# Patient Record
Sex: FEMALE | Race: WHITE
[De-identification: names, ages, dates, MRNs, and addresses within clinical notes are randomized per-mention and may not be internally consistent; named-entity substitution may affect disease eponyms.]

---

## 2021-03-09 ENCOUNTER — HOSPITAL ENCOUNTER (OUTPATIENT)
Dept: HOSPITAL 76 - LAB.N | Age: 26
Discharge: HOME | End: 2021-03-09
Attending: ADVANCED PRACTICE MIDWIFE
Payer: COMMERCIAL

## 2021-03-09 DIAGNOSIS — O26.859: Primary | ICD-10-CM

## 2021-03-12 ENCOUNTER — HOSPITAL ENCOUNTER (OUTPATIENT)
Dept: HOSPITAL 76 - DI | Age: 26
Discharge: HOME | End: 2021-03-12
Attending: OBSTETRICS & GYNECOLOGY
Payer: COMMERCIAL

## 2021-03-12 DIAGNOSIS — O26.859: Primary | ICD-10-CM

## 2021-03-13 NOTE — ULTRASOUND REPORT
PROCEDURE:  OB First Trimester w/TV

 

INDICATIONS:  SPOTTING, PREGNANT

 

OUTSIDE/PRIOR DATING DATA:  

Last menstrual period (LMP): 12/2/2020.  

LMP-based estimated date of delivery (HIWOT): 9/8/2021.  

First dating scan (date and location): 3/12/2021.  

Estimated date of delivery (HIWOT) from first dating scan: N/K: No evidence of intrauterine pregnancy o
r ectopic pregnancy.

 

TECHNIQUE:  

Real-time scanning was performed of the maternal pelvic organs, with image documentation.  Endovagina
l scanning was also performed to better visualize the fetus and maternal ovaries.  

 

COMPARISON:  None

 

FINDINGS:  There is no evidence of intrauterine pregnancy or ectopic pregnancy. The uterus measures 7
 x 3 x 4 cm. Endometrium is 7.4 mm in thickness.

 

Left ovary measures 3.3 x 4.2 x 2.0 cm. Right ovary measures 3.2 x 1.9 x 2.9 cm. There is normal intr
aovarian flow noted bilaterally.

 

No abnormal adnexal masses.

 

 

IMPRESSION:  

1. No evidence of intrauterine pregnancy or ectopic pregnancy.

2. Otherwise unremarkable pelvic ultrasound.

 

A preliminary report with the above findings was provided at the time of the study by [facility].

 

Reviewed by: Chaitanya Valdez MD on 3/13/2021 6:33 AM [place]

Approved by: Chaitanya Valdez MD on 3/13/2021 6:33 AM [facility]

 

 

Station ID:  [id number]

## 2021-03-15 ENCOUNTER — HOSPITAL ENCOUNTER (OUTPATIENT)
Dept: HOSPITAL 76 - LAB | Age: 26
Discharge: HOME | End: 2021-03-15
Attending: OBSTETRICS & GYNECOLOGY
Payer: COMMERCIAL

## 2021-03-15 DIAGNOSIS — Z20.822: ICD-10-CM

## 2021-03-15 DIAGNOSIS — Z01.812: Primary | ICD-10-CM

## 2021-03-15 DIAGNOSIS — O02.1: ICD-10-CM

## 2021-03-15 LAB
BASOPHILS NFR BLD AUTO: 0.1 10^3/UL (ref 0–0.1)
BASOPHILS NFR BLD AUTO: 0.7 %
EOSINOPHIL # BLD AUTO: 0.2 10^3/UL (ref 0–0.7)
EOSINOPHIL NFR BLD AUTO: 2.5 %
ERYTHROCYTE [DISTWIDTH] IN BLOOD BY AUTOMATED COUNT: 16 % (ref 12–15)
HCT VFR BLD AUTO: 36.1 % (ref 37–47)
HGB UR QL STRIP: 10.2 G/DL (ref 12–16)
LYMPHOCYTES # SPEC AUTO: 1.6 10^3/UL (ref 1.5–3.5)
LYMPHOCYTES NFR BLD AUTO: 22 %
MCH RBC QN AUTO: 22.4 PG (ref 27–31)
MCHC RBC AUTO-ENTMCNC: 28.3 G/DL (ref 32–36)
MCV RBC AUTO: 79.2 FL (ref 81–99)
MONOCYTES # BLD AUTO: 0.6 10^3/UL (ref 0–1)
MONOCYTES NFR BLD AUTO: 7.9 %
NEUTROPHILS # BLD AUTO: 4.8 10^3/UL (ref 1.5–6.6)
NEUTROPHILS # SNV AUTO: 7.2 X10^3/UL (ref 4.8–10.8)
NEUTROPHILS NFR BLD AUTO: 66.9 %
NRBC # BLD AUTO: 0 /100WBC
NRBC # BLD AUTO: 0 X10^3/UL
PDW BLD AUTO: 11.1 FL (ref 7.9–10.8)
PLAT MORPH BLD: (no result)
PLATELET # BLD: 251 10^3/UL (ref 130–450)
PLATELET BLD QL SMEAR: (no result)
RBC MAR: 4.56 10^6/UL (ref 4.2–5.4)
RBC MORPH BLD: (no result)

## 2021-03-15 PROCEDURE — 85025 COMPLETE CBC W/AUTO DIFF WBC: CPT

## 2021-03-15 PROCEDURE — 36415 COLL VENOUS BLD VENIPUNCTURE: CPT

## 2021-03-18 ENCOUNTER — HOSPITAL ENCOUNTER (OUTPATIENT)
Dept: HOSPITAL 76 - SDS | Age: 26
Discharge: HOME | End: 2021-03-18
Attending: OBSTETRICS & GYNECOLOGY
Payer: COMMERCIAL

## 2021-03-18 VITALS — DIASTOLIC BLOOD PRESSURE: 62 MMHG | SYSTOLIC BLOOD PRESSURE: 101 MMHG

## 2021-03-18 DIAGNOSIS — F17.200: ICD-10-CM

## 2021-03-18 DIAGNOSIS — E66.3: ICD-10-CM

## 2021-03-18 DIAGNOSIS — D25.0: Primary | ICD-10-CM

## 2021-03-18 LAB — HCG UR QL: NEGATIVE

## 2021-03-18 PROCEDURE — 58558 HYSTEROSCOPY BIOPSY: CPT

## 2021-03-18 PROCEDURE — 0UDB8ZX EXTRACTION OF ENDOMETRIUM, VIA NATURAL OR ARTIFICIAL OPENING ENDOSCOPIC, DIAGNOSTIC: ICD-10-PCS | Performed by: OBSTETRICS & GYNECOLOGY

## 2021-03-18 PROCEDURE — 84702 CHORIONIC GONADOTROPIN TEST: CPT

## 2021-03-18 PROCEDURE — 81025 URINE PREGNANCY TEST: CPT

## 2021-03-18 NOTE — ANESTHESIA
Pre-Anesthesia VS, & Labs





- Diagnosis


Retained products of conception





- Procedure





myosure hysterscopy, D&C


Vital Signs: 





                                        











Temp Pulse Resp BP Pulse Ox


 


 36.7 C   97   16   135/74 H  100 


 


 03/18/21 06:45  03/18/21 06:45  03/18/21 06:45  03/18/21 06:45  03/18/21 06:45











Height: 5 ft 1 in


Weight (kg): 63 kg


Body Mass Index: 26.2


BMI Classification: Overweight





- NPO


>8 hours





- Pregnancy


Is Patient Pregnant?: No





- Lab Results


Current Lab Results: 





Laboratory Tests





03/18/21 07:16: POC Whole Bld Glucose 100











Home Medications and Allergies


Home Medications: 


Ambulatory Orders





Propranolol [Inderal] 10 mg PO BID 03/17/21 











                                        





Propranolol [Inderal] 10 mg PO BID 03/17/21 








Allergies/Adverse Reactions: 


                                    Allergies











Allergy/AdvReac Type Severity Reaction Status Date / Time


 


cefaclor Allergy  Unknown Verified 03/16/21 11:27














Anes History & Medical History





- Anesthetic History


Family history of Anesthesia Complications: Denies


Family history of Malignant Hyperthermia: Denies





- Medical History


Cardiovascular: reports: None, Other (palpitations when anxious)


Pulmonary: reports: None


Gastrointestinal: reports: None


Urinary: reports: None


Neuro: reports: None


Musculoskeletal: reports: None


Endocrine/Autoimmune: reports: None


Blood Disorders: reports: None


Skin: reports: None


Smoking Status: Current every day smoker


Psychosocial: reports: Anxiety


History of Cancer?: No





Exam


General: Alert, Oriented x3, Cooperative, No acute distress


Dental: WNL


Mouth Opening: 3 Fingerbreadth


Neck Mobility: Normal


Mallampati classification: I


Thyromental Distance: 4-6 cm


Mental/Cognitive Status: Alert/Oriented X3, Normal for patient





Plan


Anesthesia Type: General


Consent for Procedure(s) Verified and Reviewed: Yes


Code Status: Attempt Resuscitation


ASA classification: 1-Healthy patient


Is this case an emergency?: No

## 2021-03-18 NOTE — OPERATIVE REPORT
Operative Report





- General


Procedure Date: 03/18/21


Planned Procedure: 


Hysterscopy with Myosure





Pre-Op Diagnosis: persistant Quantiaive HCG retained POC


Procedure Performed: 


Same








Post Op Diagnosis: Persistant QuantativeHCG





- Procedure Note


Primary Surgeon: Juan Bunn MD


Anesthesia Provider: Lashawn Jenkins CRNA


Anesthesia Technique: Local, MAC


Pathology: 


Endometrium





Estimated Blood Loss (mL): 10

## 2021-03-18 NOTE — OPERATIVE REPORT
DATE OF SERVICE: 03/18/2021

Physician: Juan Bunn MD

 

PREOPERATIVE DIAGNOSES:  Persistent quantitative hCG, rule out retained products of conception.

 

POSTOPERATIVE DIAGNOSES:  Continued persistent quantitative hCG with empty uterus.

 

PROCEDURE PERFORMED:  Hysteroscopy with MyoSure.

 

SURGEON:  Juan Bunn MD.

  

ANESTHESIOLOGIST:  Primo Jenkins CRNA.

 

ANESTHETIC:  IV sedation with paracervical block.

 

FINDINGS:  Upon entering the uterine cavity, there was no evidence of any tissue.  Both cornu were in
spected.  There was no evidence of any bleeding.  

 

DESCRIPTION OF PROCEDURE:  Following adequate IV sedation, the patient was placed in the dorsal litho
kimi position in [facility].  At this point, she was prepped and draped in the usual fashion.  A 
timeout was performed.  The cervix was visualized with a speculum, and then the uterosacral ligaments
 were injected with 5 mL each of 0.25% Marcaine with epinephrine and 1% lidocaine.  At this time, the
 cervix was grasped with a single-tooth tenaculum.  It was then dilated up to 7 mm.  A hysteroscope w
as introduced.  The uterus was visualized, both cornu as well as anterior and posteriorly, and there 
was no evidence of any visible placental tissue.  Tissue samples were taken with the MyoSure.  These 
were sent for evaluation.  At this point, the procedure was finished.  The cervix was released from t
he single-tooth tenaculum.  The patient tolerated the procedure well and was taken to recovery in sta
ble condition.  Fluid deficit was 270 mL.  

 

 

DD: 03/18/2021 08:40

TD: 03/18/2021 08:43

Job #: [id number]

## 2021-03-18 NOTE — ANESTHESIA POST OP EVALUATION
Anesthesia Post Eval





- Post Anesthesia Eval


Vitals: 





                                Last Vital Signs











Temp  36.0 C L  03/18/21 08:26


 


Pulse  73   03/18/21 08:45


 


Resp  22   03/18/21 08:45


 


BP  104/74   03/18/21 08:45


 


Pulse Ox  100   03/18/21 08:45











CV Function Including HR & BP: positive: Stable


Pain Control: positive: Satisfactory


Nausea & Vomiting: positive: Negative


Mental Status: positive: Baseline


Respiratory Status: Airway Patent


Hydration Status: Satisfactory


Anesthesia Complications: positive: None

## 2021-03-24 ENCOUNTER — HOSPITAL ENCOUNTER (OUTPATIENT)
Dept: HOSPITAL 76 - LAB | Age: 26
Discharge: HOME | End: 2021-03-24
Attending: OBSTETRICS & GYNECOLOGY
Payer: COMMERCIAL

## 2021-03-24 DIAGNOSIS — O26.859: Primary | ICD-10-CM

## 2021-03-24 PROCEDURE — 36415 COLL VENOUS BLD VENIPUNCTURE: CPT

## 2021-03-24 PROCEDURE — 84702 CHORIONIC GONADOTROPIN TEST: CPT

## 2022-12-24 ENCOUNTER — HOSPITAL ENCOUNTER (EMERGENCY)
Dept: HOSPITAL 76 - ED | Age: 27
Discharge: HOME | End: 2022-12-24
Payer: COMMERCIAL

## 2022-12-24 VITALS — SYSTOLIC BLOOD PRESSURE: 138 MMHG | DIASTOLIC BLOOD PRESSURE: 94 MMHG

## 2022-12-24 DIAGNOSIS — K62.5: Primary | ICD-10-CM

## 2022-12-24 DIAGNOSIS — F17.200: ICD-10-CM

## 2022-12-24 LAB
ALBUMIN DIAFP-MCNC: 4.2 G/DL (ref 3.2–5.5)
ALBUMIN/GLOB SERPL: 1.1 {RATIO} (ref 1–2.2)
ALP SERPL-CCNC: 97 IU/L (ref 42–121)
ALT SERPL W P-5'-P-CCNC: 39 IU/L (ref 10–60)
ANION GAP SERPL CALCULATED.4IONS-SCNC: 9 MMOL/L (ref 6–13)
AST SERPL W P-5'-P-CCNC: 31 IU/L (ref 10–42)
BASOPHILS NFR BLD AUTO: 0.1 10^3/UL (ref 0–0.1)
BASOPHILS NFR BLD AUTO: 0.8 %
BILIRUB BLD-MCNC: 0.7 MG/DL (ref 0.2–1)
BUN SERPL-MCNC: 16 MG/DL (ref 6–20)
CALCIUM UR-MCNC: 9.1 MG/DL (ref 8.5–10.3)
CHLORIDE SERPL-SCNC: 104 MMOL/L (ref 101–111)
CLARITY UR REFRACT.AUTO: CLEAR
CO2 SERPL-SCNC: 28 MMOL/L (ref 21–32)
CREAT SERPLBLD-SCNC: 0.7 MG/DL (ref 0.4–1)
EOSINOPHIL # BLD AUTO: 0.1 10^3/UL (ref 0–0.7)
EOSINOPHIL NFR BLD AUTO: 1.8 %
ERYTHROCYTE [DISTWIDTH] IN BLOOD BY AUTOMATED COUNT: 15 % (ref 12–15)
GFRSERPLBLD MDRD-ARVRAT: 100 ML/MIN/{1.73_M2} (ref 89–?)
GLOBULIN SER-MCNC: 3.8 G/DL (ref 2.1–4.2)
GLUCOSE SERPL-MCNC: 96 MG/DL (ref 70–100)
GLUCOSE UR QL STRIP.AUTO: NEGATIVE MG/DL
HCG UR QL: NEGATIVE
HCT VFR BLD AUTO: 43.5 % (ref 37–47)
HGB UR QL STRIP: 13.6 G/DL (ref 12–16)
KETONES UR QL STRIP.AUTO: NEGATIVE MG/DL
LIPASE SERPL-CCNC: 27 U/L (ref 22–51)
LYMPHOCYTES # SPEC AUTO: 2.5 10^3/UL (ref 1.5–3.5)
LYMPHOCYTES NFR BLD AUTO: 34.2 %
MCH RBC QN AUTO: 28.9 PG (ref 27–31)
MCHC RBC AUTO-ENTMCNC: 31.3 G/DL (ref 32–36)
MCV RBC AUTO: 92.4 FL (ref 81–99)
MONOCYTES # BLD AUTO: 0.4 10^3/UL (ref 0–1)
MONOCYTES NFR BLD AUTO: 5.7 %
NEUTROPHILS # BLD AUTO: 4.1 10^3/UL (ref 1.5–6.6)
NEUTROPHILS # SNV AUTO: 7.2 X10^3/UL (ref 4.8–10.8)
NEUTROPHILS NFR BLD AUTO: 57.1 %
NITRITE UR QL STRIP.AUTO: NEGATIVE
NRBC # BLD AUTO: 0 /100WBC
NRBC # BLD AUTO: 0 X10^3/UL
PDW BLD AUTO: 11.6 FL (ref 7.9–10.8)
PH UR STRIP.AUTO: 6 PH (ref 5–7.5)
PLATELET # BLD: 256 10^3/UL (ref 130–450)
POTASSIUM SERPL-SCNC: 4 MMOL/L (ref 3.5–5)
PROT SPEC-MCNC: 8 G/DL (ref 6.7–8.2)
PROT UR STRIP.AUTO-MCNC: NEGATIVE MG/DL
RBC # UR STRIP.AUTO: NEGATIVE /UL
RBC MAR: 4.71 10^6/UL (ref 4.2–5.4)
SODIUM SERPLBLD-SCNC: 141 MMOL/L (ref 135–145)
SP GR UR STRIP.AUTO: 1.02 (ref 1–1.03)
UROBILINOGEN UR QL STRIP.AUTO: (no result) E.U./DL
UROBILINOGEN UR STRIP.AUTO-MCNC: NEGATIVE MG/DL

## 2022-12-24 PROCEDURE — 36415 COLL VENOUS BLD VENIPUNCTURE: CPT

## 2022-12-24 PROCEDURE — 83690 ASSAY OF LIPASE: CPT

## 2022-12-24 PROCEDURE — 85025 COMPLETE CBC W/AUTO DIFF WBC: CPT

## 2022-12-24 PROCEDURE — 81001 URINALYSIS AUTO W/SCOPE: CPT

## 2022-12-24 PROCEDURE — 81025 URINE PREGNANCY TEST: CPT

## 2022-12-24 PROCEDURE — 81003 URINALYSIS AUTO W/O SCOPE: CPT

## 2022-12-24 PROCEDURE — 80053 COMPREHEN METABOLIC PANEL: CPT

## 2022-12-24 PROCEDURE — 99283 EMERGENCY DEPT VISIT LOW MDM: CPT

## 2022-12-24 PROCEDURE — 99282 EMERGENCY DEPT VISIT SF MDM: CPT

## 2022-12-24 PROCEDURE — 87086 URINE CULTURE/COLONY COUNT: CPT

## 2022-12-24 NOTE — ED PHYSICIAN DOCUMENTATION
History of Present Illness





- Stated complaint


Stated Complaint: BLOOD IN STOOL





- Chief complaint


Chief Complaint: Abd Pain





- Additonal information


Additional information: 





27-year-old female presents emergency department for evaluation of painless 

rectal bleeding.  She was at work this morning when she had a bowel movement.  

When she wiped she noticed a lot of blood on the tissue and looked in the toilet

and states it was filled with blood.  No history of similar.  She denies a 

history of constipation.  Typically has daily bowel movements.





She is not anticoagulated and does not take daily NSAID medications.  Denies 

hematochezia or melena. Frequent alcohol





Past surgical history most significant only for a D&C.





Review of Systems


Constitutional: reports: Reviewed and negative


Throat: reports: Reviewed and negative


Cardiac: reports: Reviewed and negative


Respiratory: reports: Reviewed and negative


GI: reports: Other (Rectal bleeding)


: reports: Reviewed and negative


Skin: reports: Reviewed and negative





PD PAST MEDICAL HISTORY





- Past Medical History


Cardiovascular: None, Other (palpitations when anxious)


Respiratory: None


Neuro: None


Endocrine/Autoimmune: None


GI: None


: None


HEENT: None


Psych: Anxiety


Musculoskeletal: None


Derm: None





- Past Surgical History


Past Surgical History: No





- Present Medications


Home Medications: 


                                Ambulatory Orders











 Medication  Instructions  Recorded  Confirmed


 


Ibuprofen [Motrin] 400 mg PO Q6H PRN #30 tablet 07/18/16 


 


Propranolol [Inderal] 10 mg PO BID 03/17/21 03/17/21














- Allergies


Allergies/Adverse Reactions: 


                                    Allergies











Allergy/AdvReac Type Severity Reaction Status Date / Time


 


cefaclor Allergy  Unknown Verified 03/16/21 11:27














- Social History


Does the pt smoke?: Yes


Smoking Status: Current every day smoker


Does the pt drink ETOH?: Yes


Does the pt have substance abuse?: No





PD ED PE NORMAL





- General


General: Alert and oriented X 3, No acute distress





- HEENT


HEENT: PERRL





- Cardiac


Cardiac: RRR, No murmur





- Respiratory


Respiratory: No respiratory distress, Clear bilaterally





- Abdomen


Abdomen: Normal bowel sounds, Soft





- Rectal


Rectal: Other (Chaperoned rectal exam with MANFRED Mahoney.  Externally no fissures

 or hemorrhoids seen.  No tenderness of the rectum.  Digital rectal exam 

revealed no tenderness and no blood in the vault.  No stool.)





- Derm


Derm: Normal color, Warm and dry





- Extremities


Extremities: No deformity, No tenderness to palpate, Normal ROM s pain





- Neuro


Neuro: Alert and oriented X 3, CNs 2-12 intact


Eye Opening: Spontaneous


Motor: Obeys Commands


Verbal: Oriented


GCS Score: 15





Results





- Vitals


Vitals: 





                               Vital Signs - 24 hr











  12/24/22





  09:14


 


Temperature 36.4 C L


 


Heart Rate 97


 


Respiratory 16





Rate 


 


Blood Pressure 120/84 H


 


O2 Saturation 100








                                     Oxygen











O2 Source                      Room air

















- Labs


Labs: 





                                Laboratory Tests











  12/24/22 12/24/22





  09:45 09:45


 


WBC  7.2 


 


RBC  4.71 


 


Hgb  13.6 


 


Hct  43.5 


 


MCV  92.4 


 


MCH  28.9 


 


MCHC  31.3 L 


 


RDW  15.0 


 


Plt Count  256 


 


MPV  11.6 H 


 


Neut # (Auto)  4.1 


 


Lymph # (Auto)  2.5 


 


Mono # (Auto)  0.4 


 


Eos # (Auto)  0.1 


 


Baso # (Auto)  0.1 


 


Absolute Nucleated RBC  0.00 


 


Nucleated RBC %  0.0 


 


Sodium   141


 


Potassium   4.0


 


Chloride   104


 


Carbon Dioxide   28


 


Anion Gap   9.0


 


BUN   16


 


Creatinine   0.7


 


Estimated GFR (MDRD)   100


 


Glucose   96


 


Calcium   9.1


 


Total Bilirubin   0.7


 


AST   31


 


ALT   39


 


Alkaline Phosphatase   97


 


Total Protein   8.0


 


Albumin   4.2


 


Globulin   3.8


 


Albumin/Globulin Ratio   1.1


 


Lipase   27














PD Medical Decision Making





- ED course


Complexity details: reviewed results, considered differential, d/w patient


ED course: 





27-year-old female presents the emergency department for evaluation of painless 

rectal bleeding that she noted this morning when she defecated while at work.  

No history of similar in the past.





Here in the emergency department we did obtain a CBC and electrolytes with no 

worrisome findings such as anemia or an elevated BUN.  Her abdominal exam was 

benign.  Chaperoned rectal exam revealed no obvious findings such as hemorrhoid 

or fissure.  There was no stool available for testing.





I do suspect that she likely has an internal hemorrhoid.  I making the 

recommendation for her to use Preparation H suppository after her bowel 

movements each day for the next 3 to 4 days.  I am also recommending her to 

soften her stools with Metamucil.  If symptoms not markedly improved would 

benefit from follow-up with her primary care provider for evaluation of a col

onoscopy. Given age and lack of pertinent hx I doubt condition such as 

inflammatory bowel disease.  No abdominal tenderness was elicited, therefore I 

doubt Meckel's diverticulitis.  In addition given her unremarkable labs we 

elected to defer any CT imaging today.





Patient is discharged home in stable condition.  Emergent return precautions 

discussed





Departure





- Departure


Disposition: 01 Home, Self Care


Clinical Impression: 


 Rectal bleed





Condition: Stable


Record reviewed to determine appropriate education?: Yes


Comments: 


You are seen today in the emergency department for painless rectal bleeding.  

Here in the emergency department we did obtain a CBC and electrolytes and they 

are essentially normal.  Your vital signs are also normal.  We did perform a 

chaperoned rectal exam and did not find anything to suggest anal fissures, 

external hemorrhoids or internal hemorrhoids though I suspect you may have 1 

higher up than could be felt on the exam.





I do recommend that you increase your water intake and begin taking a bulking 

stool softener such as Metamucil daily.  This will help prevent any straining 

that you may have with defecation.  I would also like you to buy Preparation H 

suppositories over-the-counter and insert 1 each day for the next 3 to 4 days 

after your bowel movements.





If you find that this rectal bleeding continues you will need to see your 

primary care doctor because at that point you should be referred for colonoscopy

 for evaluation of other concerns or causes of rectal bleeding such as polyps, 

inflammatory bowel disease or hemorrhoids





Return to the emergency department for any fainting episodes or severe bleeding.

## 2023-03-09 ENCOUNTER — HOSPITAL ENCOUNTER (EMERGENCY)
Dept: HOSPITAL 76 - ED | Age: 28
Discharge: HOME | End: 2023-03-09
Payer: COMMERCIAL

## 2023-03-09 ENCOUNTER — HOSPITAL ENCOUNTER (OUTPATIENT)
Dept: HOSPITAL 76 - EMS | Age: 28
Discharge: ANOTHER HOSPITAL | End: 2023-03-09
Payer: COMMERCIAL

## 2023-03-09 VITALS — SYSTOLIC BLOOD PRESSURE: 115 MMHG | DIASTOLIC BLOOD PRESSURE: 84 MMHG

## 2023-03-09 DIAGNOSIS — Z79.899: ICD-10-CM

## 2023-03-09 DIAGNOSIS — R06.4: ICD-10-CM

## 2023-03-09 DIAGNOSIS — E87.6: ICD-10-CM

## 2023-03-09 DIAGNOSIS — F41.9: Primary | ICD-10-CM

## 2023-03-09 DIAGNOSIS — R20.2: ICD-10-CM

## 2023-03-09 DIAGNOSIS — R20.2: Primary | ICD-10-CM

## 2023-03-09 DIAGNOSIS — F17.200: ICD-10-CM

## 2023-03-09 DIAGNOSIS — R00.0: ICD-10-CM

## 2023-03-09 LAB
ALBUMIN DIAFP-MCNC: 4.5 G/DL (ref 3.2–5.5)
ALBUMIN/GLOB SERPL: 1.2 {RATIO} (ref 1–2.2)
ALP SERPL-CCNC: 86 IU/L (ref 42–121)
ALT SERPL W P-5'-P-CCNC: 35 IU/L (ref 10–60)
ANION GAP SERPL CALCULATED.4IONS-SCNC: 16 MMOL/L (ref 6–13)
AST SERPL W P-5'-P-CCNC: 32 IU/L (ref 10–42)
B-HCG SERPL QL: NEGATIVE
BASOPHILS NFR BLD AUTO: 0.1 10^3/UL (ref 0–0.1)
BASOPHILS NFR BLD AUTO: 0.6 %
BILIRUB BLD-MCNC: 0.5 MG/DL (ref 0.2–1)
BUN SERPL-MCNC: 10 MG/DL (ref 6–20)
CALCIUM UR-MCNC: 9.5 MG/DL (ref 8.5–10.3)
CHLORIDE SERPL-SCNC: 100 MMOL/L (ref 101–111)
CO2 SERPL-SCNC: 25 MMOL/L (ref 21–32)
CREAT SERPLBLD-SCNC: 0.7 MG/DL (ref 0.4–1)
EOSINOPHIL # BLD AUTO: 0.1 10^3/UL (ref 0–0.7)
EOSINOPHIL NFR BLD AUTO: 0.5 %
ERYTHROCYTE [DISTWIDTH] IN BLOOD BY AUTOMATED COUNT: 16.3 % (ref 12–15)
GFRSERPLBLD MDRD-ARVRAT: 100 ML/MIN/{1.73_M2} (ref 89–?)
GLOBULIN SER-MCNC: 3.7 G/DL (ref 2.1–4.2)
GLUCOSE SERPL-MCNC: 88 MG/DL (ref 70–100)
HCT VFR BLD AUTO: 46 % (ref 37–47)
HGB UR QL STRIP: 14.5 G/DL (ref 12–16)
LIPASE SERPL-CCNC: 27 U/L (ref 22–51)
LYMPHOCYTES # SPEC AUTO: 1.7 10^3/UL (ref 1.5–3.5)
LYMPHOCYTES NFR BLD AUTO: 14 %
MCH RBC QN AUTO: 29.2 PG (ref 27–31)
MCHC RBC AUTO-ENTMCNC: 31.5 G/DL (ref 32–36)
MCV RBC AUTO: 92.7 FL (ref 81–99)
MONOCYTES # BLD AUTO: 0.5 10^3/UL (ref 0–1)
MONOCYTES NFR BLD AUTO: 3.9 %
NEUTROPHILS # BLD AUTO: 9.8 10^3/UL (ref 1.5–6.6)
NEUTROPHILS # SNV AUTO: 12.2 X10^3/UL (ref 4.8–10.8)
NEUTROPHILS NFR BLD AUTO: 80.8 %
NRBC # BLD AUTO: 0 /100WBC
NRBC # BLD AUTO: 0 X10^3/UL
PDW BLD AUTO: 11.7 FL (ref 7.9–10.8)
PLATELET # BLD: 245 10^3/UL (ref 130–450)
POTASSIUM SERPL-SCNC: 3 MMOL/L (ref 3.5–5)
PROT SPEC-MCNC: 8.2 G/DL (ref 6.7–8.2)
RBC MAR: 4.96 10^6/UL (ref 4.2–5.4)
SODIUM SERPLBLD-SCNC: 141 MMOL/L (ref 135–145)

## 2023-03-09 PROCEDURE — 99284 EMERGENCY DEPT VISIT MOD MDM: CPT

## 2023-03-09 PROCEDURE — 85025 COMPLETE CBC W/AUTO DIFF WBC: CPT

## 2023-03-09 PROCEDURE — 99283 EMERGENCY DEPT VISIT LOW MDM: CPT

## 2023-03-09 PROCEDURE — 36415 COLL VENOUS BLD VENIPUNCTURE: CPT

## 2023-03-09 PROCEDURE — 80053 COMPREHEN METABOLIC PANEL: CPT

## 2023-03-09 PROCEDURE — 83690 ASSAY OF LIPASE: CPT

## 2023-03-09 PROCEDURE — 84703 CHORIONIC GONADOTROPIN ASSAY: CPT

## 2023-03-09 NOTE — ED PHYSICIAN DOCUMENTATION
History of Present Illness





- Stated complaint


Stated Complaint: PANIC ATTACK





- Chief complaint


Chief Complaint: MHE





- Additonal information


Additional information: 





27-year-old female presents to the emergency department for concerns of a panic 

attack.  She works as a  and suddenly began to feel flushed have a 

racing heart, felt paresthesias in her hands and then on the top of her head and

both feet.  reported bith her hands began to spasm.  She felt like she could not

talk.  worried she was having a stroke.  She is transported here via EMS.





Patient currently undergoing fertility treatments uncertain if pregnant.  She 

does have a history of anxiety for which she takes Lexapro.  Occasionally uses 

Xanax and used it about 2 weeks ago.  She states she has had panic attacks in 

the past.  She cannot identify precipitating cause today.





On presentation and evaluation I am greeted by a anxious appearing woman.  She 

has trembling hands.  Fluid speech.  No focal deficits.  She is tearful but 

trying not to cry.





She denies any recent illness though did vomit twice this morning.  No diarrhea,

chest pain, shortness of air unilateral leg swelling, cough or fevers.  No 

abdominal pain.  No dysuria urgency or frequency.





Review of Systems


Constitutional: reports: Reviewed and negative


Eyes: reports: Reviewed and negative


Throat: reports: Reviewed and negative


Cardiac: reports: Reviewed and negative


Respiratory: reports: Reviewed and negative


GI: reports: Vomiting


: reports: Reviewed and negative


Skin: reports: Reviewed and negative


Musculoskeletal: reports: Reviewed and negative





PD PAST MEDICAL HISTORY





- Past Medical History


Cardiovascular: None, Other (palpitations when anxious)


Respiratory: None


Neuro: None


Endocrine/Autoimmune: None


GI: None


: None


HEENT: None


Psych: Anxiety


Musculoskeletal: None


Derm: None





- Past Surgical History


Past Surgical History: No





- Present Medications


Home Medications: 


                                Ambulatory Orders











 Medication  Instructions  Recorded  Confirmed


 


Escitalopram [Lexapro] 10 mg PO DAILY 03/09/23 03/09/23














- Allergies


Allergies/Adverse Reactions: 


                                    Allergies











Allergy/AdvReac Type Severity Reaction Status Date / Time


 


cefaclor Allergy  Unknown Verified 03/16/21 11:27














- Social History


Does the pt smoke?: Yes


Smoking Status: Current every day smoker


Does the pt drink ETOH?: Yes


Does the pt have substance abuse?: No





PD ED PE NORMAL





- General


General: Alert and oriented X 3, Well developed/nourished.  No: No acute 

distress (Anxious appearing)





- HEENT


HEENT: Atraumatic, Moist mucous membranes, Pharynx benign





- Neck


Neck: Supple, no meningeal sign, No adenopathy





- Cardiac


Cardiac: RRR, No murmur





- Respiratory


Respiratory: No respiratory distress, Clear bilaterally





- Abdomen


Abdomen: Normal bowel sounds, Soft





- Back


Back: No CVA TTP





- Derm


Derm: Normal color, Warm and dry, No rash





- Extremities


Extremities: No deformity, No tenderness to palpate, Normal ROM s pain





- Neuro


Neuro: Alert and oriented X 3, CNs 2-12 intact


Eye Opening: Spontaneous


Motor: Obeys Commands


Verbal: Oriented


GCS Score: 15





Results





- Vitals


Vitals: 


                               Vital Signs - 24 hr











  03/09/23





  13:20


 


Temperature 36.4 C L


 


Heart Rate 102 H


 


Respiratory 22





Rate 


 


Blood Pressure 131/95 H


 


O2 Saturation 100








                                     Oxygen











O2 Source                      Room air

















- Labs


Labs: 


                                Laboratory Tests











  03/09/23 03/09/23 03/09/23





  13:48 13:48 13:48


 


WBC  12.2 H  


 


RBC  4.96  


 


Hgb  14.5  


 


Hct  46.0  


 


MCV  92.7  


 


MCH  29.2  


 


MCHC  31.5 L  


 


RDW  16.3 H  


 


Plt Count  245  


 


MPV  11.7 H  


 


Neut # (Auto)  9.8 H  


 


Lymph # (Auto)  1.7  


 


Mono # (Auto)  0.5  


 


Eos # (Auto)  0.1  


 


Baso # (Auto)  0.1  


 


Absolute Nucleated RBC  0.00  


 


Nucleated RBC %  0.0  


 


Sodium   141 


 


Potassium   3.0 L 


 


Chloride   100 L 


 


Carbon Dioxide   25 


 


Anion Gap   16.0 H 


 


BUN   10 


 


Creatinine   0.7 


 


Estimated GFR (MDRD)   100 


 


Glucose   88 


 


Calcium   9.5 


 


Total Bilirubin   0.5 


 


AST   32 


 


ALT   35 


 


Alkaline Phosphatase   86 


 


Total Protein   8.2 


 


Albumin   4.5 


 


Globulin   3.7 


 


Albumin/Globulin Ratio   1.2 


 


Lipase   27 


 


Serum HCG, Qual    NEGATIVE














PD Medical Decision Making





- ED course


Complexity details: reviewed results, re-evaluated patient, considered 

differential, d/w patient


ED course: 


This is a well-appearing though anxious 27-year-old female that presents the 

emergency department when she was at work and began to have flushing, 

paresthesias in her hands both legs and on the top of her head.  She felt like 

she could not speak.  She began to have carpal spasms in both her hands.  She 

states she has a history of anxiety.  She occasionally takes Xanax.  She is also

 currently undergoing fertility treatments.





On presentation to the emergency department NIHSS is 0.  Sparing very mild 

tachycardia with a heart rate of 102 unremarkable vital signs.  No fever.  

Cardiopulmonary auscultation was otherwise unremarkable.





Clinically the patient was tearful and anxious appearing.  I did give her a 

milligram of Ativan and on reevaluation less anxious though she is fixated on 

the concern that she is having a stroke.





I did obtain a CBC and electrolytes as well as a serum hCG.  Per my 

interpretation no acute worrisome findings.  Her potassium is mildly low which I

 attribute to the anxiety and shift of potassium intracellularly.  She was 

administered 40 potassium orally here in the emergency department.





I discussed with patient that with her history of anxiety and presentation most 

likely she had a panic attack.  Clinically her symptoms are not consistent with 

acute CVA and I did defer imaging of her brain given this.  She has a stable 

gait.  She is discharged home in stable condition to follow closely with her 

PCP.  May benefit from counseling or talk therapy.  She can remain on the 

Lexapro. Emergent return precautions otherwise discussed








Departure





- Departure


Disposition: 01 Home, Self Care


Clinical Impression: 


 Paresthesias, Anxiety, Hypokalemia





Condition: Stable


Record reviewed to determine appropriate education?: Yes


Instructions:  ED Stress React


Comments: 


Anjelica flores came to the emergency department because at work he began to have 

tingling in your hands on top of your head and your lower extremities.  Your 

hands also began to go into spasm and he felt like he could not talk.  You do 

have a history of anxiety though you do not report having any panic attacks in 

the past.


I am here in the emergency department your labs were essentially normal with the

 exception of a low potassium.  This could be causing some of your symptoms and 

we did give you some extra potassium here in the ER.





As we discussed at the bedside your symptoms are not consistent with a stroke.  

A panic attack is more consistent with your symptoms.  We did give you a 

milligram of Ativan today in the emergency department which seems to have 

helped.  In general I would like you to drink plenty of fluids at home and get 

lots of rest.  Please discuss this ED visit with your primary care provider.  

You would benefit from referral for talk therapy or counseling.





Return to the ER if you develop facial droop, focal weakness in your arms or 

legs or have severe difficulty breathing or chest pain.





NIHSS





- Time


Time: 13:30





- Level of Consciousness


Level of consciousness: (0) Alert, Keenly responsive


LOC Questions: (0) Answers both Q's correct


LOC Commands: (0) Performs both correctly





- Gaze


Best Gaze: (0) Normal





- Visual


Visual: (0) No loss





- Facial Palsy


Facial Palsy: (0) Normal, symmetrical movement





- Motor Arms (both separate)


Motor Arm (right): (0) No drift


Motor Arm (left): (0) No drift





- Motor Legs (both separate)


Motor Leg (right): (0) No drift


Motor Leg (left): (0) No drift





- Limb Ataxia


Limb Ataxia: (0) Absent





- Sensory


Sensory: (0) Normal





- Best Language


Best Language: (0) No aphasia





- Dysarthria


Dysarthria: (0) Normal





- Extinction and Inattention (formally neg


Extinction and inattention: (0) No abnormality





- Total Score/Results


Total Score/Result: 0